# Patient Record
(demographics unavailable — no encounter records)

---

## 2025-04-22 NOTE — HISTORY OF PRESENT ILLNESS
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

## 2025-04-22 NOTE — REASON FOR VISIT
[Arrhythmia/ECG Abnorrmalities] : arrhythmia/ECG abnormalities [Hyperlipidemia] : hyperlipidemia [FreeTextEntry3] : Diamond Savage, NP